# Patient Record
Sex: MALE | Race: WHITE | NOT HISPANIC OR LATINO | URBAN - METROPOLITAN AREA
[De-identification: names, ages, dates, MRNs, and addresses within clinical notes are randomized per-mention and may not be internally consistent; named-entity substitution may affect disease eponyms.]

---

## 2018-08-25 ENCOUNTER — EMERGENCY (EMERGENCY)
Facility: HOSPITAL | Age: 63
LOS: 1 days | Discharge: HOME | End: 2018-08-25
Attending: EMERGENCY MEDICINE
Payer: MEDICARE

## 2018-08-25 VITALS
SYSTOLIC BLOOD PRESSURE: 126 MMHG | RESPIRATION RATE: 16 BRPM | OXYGEN SATURATION: 98 % | DIASTOLIC BLOOD PRESSURE: 68 MMHG | HEART RATE: 60 BPM

## 2018-08-25 VITALS
OXYGEN SATURATION: 100 % | RESPIRATION RATE: 14 BRPM | DIASTOLIC BLOOD PRESSURE: 65 MMHG | TEMPERATURE: 98 F | HEART RATE: 60 BPM | WEIGHT: 106.92 LBS | SYSTOLIC BLOOD PRESSURE: 134 MMHG

## 2018-08-25 PROBLEM — Z00.00 ENCOUNTER FOR PREVENTIVE HEALTH EXAMINATION: Status: ACTIVE | Noted: 2018-08-25

## 2018-08-25 LAB
APTT BLD: 44.9 SEC — HIGH (ref 27.5–37.4)
INR BLD: 2.71 RATIO — HIGH (ref 0.88–1.16)
PROTHROM AB SERPL-ACNC: 30.2 SEC — HIGH (ref 9.8–12.7)

## 2018-08-25 PROCEDURE — 72100 X-RAY EXAM L-S SPINE 2/3 VWS: CPT

## 2018-08-25 PROCEDURE — 99284 EMERGENCY DEPT VISIT MOD MDM: CPT

## 2018-08-25 PROCEDURE — 72100 X-RAY EXAM L-S SPINE 2/3 VWS: CPT | Mod: 26

## 2018-08-25 PROCEDURE — 85610 PROTHROMBIN TIME: CPT

## 2018-08-25 PROCEDURE — 85730 THROMBOPLASTIN TIME PARTIAL: CPT

## 2018-08-25 PROCEDURE — 99284 EMERGENCY DEPT VISIT MOD MDM: CPT | Mod: 25

## 2018-08-25 PROCEDURE — 93971 EXTREMITY STUDY: CPT

## 2018-08-25 PROCEDURE — 36415 COLL VENOUS BLD VENIPUNCTURE: CPT

## 2018-08-25 PROCEDURE — 93971 EXTREMITY STUDY: CPT | Mod: 26,RT

## 2018-08-25 RX ORDER — OXYCODONE AND ACETAMINOPHEN 5; 325 MG/1; MG/1
1 TABLET ORAL ONCE
Qty: 0 | Refills: 0 | Status: DISCONTINUED | OUTPATIENT
Start: 2018-08-25 | End: 2018-08-25

## 2018-08-25 RX ORDER — CYCLOBENZAPRINE HYDROCHLORIDE 10 MG/1
1 TABLET, FILM COATED ORAL
Qty: 9 | Refills: 0
Start: 2018-08-25 | End: 2018-08-27

## 2018-08-25 RX ORDER — DIAZEPAM 5 MG
10 TABLET ORAL ONCE
Qty: 0 | Refills: 0 | Status: DISCONTINUED | OUTPATIENT
Start: 2018-08-25 | End: 2018-08-25

## 2018-08-25 RX ORDER — LIDOCAINE 4 G/100G
1 CREAM TOPICAL ONCE
Qty: 0 | Refills: 0 | Status: COMPLETED | OUTPATIENT
Start: 2018-08-25 | End: 2018-08-25

## 2018-08-25 RX ADMIN — Medication 10 MILLIGRAM(S): at 13:39

## 2018-08-25 RX ADMIN — OXYCODONE AND ACETAMINOPHEN 1 TABLET(S): 5; 325 TABLET ORAL at 17:33

## 2018-08-25 RX ADMIN — LIDOCAINE 1 PATCH: 4 CREAM TOPICAL at 15:28

## 2018-08-25 RX ADMIN — OXYCODONE AND ACETAMINOPHEN 1 TABLET(S): 5; 325 TABLET ORAL at 16:33

## 2018-08-25 NOTE — ED ADULT NURSE NOTE - CHPI ED NUR SYMPTOMS NEG
no motor function loss/no neck tenderness/no numbness/no difficulty bearing weight/no anorexia/no tingling/no fatigue

## 2018-08-25 NOTE — ED PROVIDER NOTE - CHPI ED SYMPTOMS NEG
no bowel dysfunction/no difficulty bearing weight/no motor function loss/no bladder dysfunction/no neck tenderness

## 2018-08-25 NOTE — ED PROVIDER NOTE - OBJECTIVE STATEMENT
63 y male presents with lower back pain radiating right leg, states last week thursday he was riding stationary bike 6-7 miles,  started feeling the lower back pain after that,  denies fall, no recent travel, no change in bowel and bladder habits.  no fever.  no abdominal pain.  states 1 year ago, had similar pain.  patient is on coumadin, states has hx of clot in the right leg.  PMD Dr MAGNOLIA Shell, no ortho, former smoker 20 years ago  PMH: CAD (coronary artery disease)    DM (diabetes mellitus)    HTN (hypertension)

## 2018-08-25 NOTE — ED PROVIDER NOTE - ATTENDING CONTRIBUTION TO CARE
63 y male presents with lower back pain radiating right leg,  vss, nontoxic, neuro intact, ambulatory,   no red flags, pt to f/u w/ pcp for further eval and mgmt

## 2018-08-25 NOTE — ED PROVIDER NOTE - PROGRESS NOTE DETAILS
doppler neg, xray no fx, rx for flexeril and percocet sent to pharmacy, given information for Dr Day ortho, recommend call  monday for appt time.  recommended otc lidoderm patch as directed, any concerns return to ed

## 2020-08-12 PROBLEM — I25.10 ATHEROSCLEROTIC HEART DISEASE OF NATIVE CORONARY ARTERY WITHOUT ANGINA PECTORIS: Chronic | Status: ACTIVE | Noted: 2018-08-25

## 2020-08-12 PROBLEM — I10 ESSENTIAL (PRIMARY) HYPERTENSION: Chronic | Status: ACTIVE | Noted: 2018-08-25

## 2020-08-25 ENCOUNTER — APPOINTMENT (OUTPATIENT)
Dept: SURGERY | Facility: CLINIC | Age: 65
End: 2020-08-25
Payer: MEDICARE

## 2020-08-25 VITALS
SYSTOLIC BLOOD PRESSURE: 130 MMHG | BODY MASS INDEX: 30.66 KG/M2 | DIASTOLIC BLOOD PRESSURE: 73 MMHG | WEIGHT: 207 LBS | HEIGHT: 69 IN

## 2020-08-25 DIAGNOSIS — Z86.39 PERSONAL HISTORY OF OTHER ENDOCRINE, NUTRITIONAL AND METABOLIC DISEASE: ICD-10-CM

## 2020-08-25 DIAGNOSIS — Z85.46 PERSONAL HISTORY OF MALIGNANT NEOPLASM OF PROSTATE: ICD-10-CM

## 2020-08-25 DIAGNOSIS — Z87.891 PERSONAL HISTORY OF NICOTINE DEPENDENCE: ICD-10-CM

## 2020-08-25 DIAGNOSIS — Z87.39 PERSONAL HISTORY OF OTHER DISEASES OF THE MUSCULOSKELETAL SYSTEM AND CONNECTIVE TISSUE: ICD-10-CM

## 2020-08-25 DIAGNOSIS — Z86.718 PERSONAL HISTORY OF OTHER VENOUS THROMBOSIS AND EMBOLISM: ICD-10-CM

## 2020-08-25 DIAGNOSIS — Z83.49 FAMILY HISTORY OF OTHER ENDOCRINE, NUTRITIONAL AND METABOLIC DISEASES: ICD-10-CM

## 2020-08-25 DIAGNOSIS — Z86.79 PERSONAL HISTORY OF OTHER DISEASES OF THE CIRCULATORY SYSTEM: ICD-10-CM

## 2020-08-25 PROCEDURE — 99204 OFFICE O/P NEW MOD 45 MIN: CPT

## 2020-08-25 RX ORDER — CARVEDILOL 6.25 MG/1
6.25 TABLET, FILM COATED ORAL
Refills: 0 | Status: ACTIVE | COMMUNITY

## 2020-08-25 RX ORDER — TAMSULOSIN HYDROCHLORIDE 0.4 MG/1
0.4 CAPSULE ORAL
Refills: 0 | Status: ACTIVE | COMMUNITY

## 2020-08-25 RX ORDER — GLIMEPIRIDE 4 MG/1
4 TABLET ORAL
Refills: 0 | Status: ACTIVE | COMMUNITY

## 2020-08-25 RX ORDER — WARFARIN SODIUM 6 MG/1
TABLET ORAL
Refills: 0 | Status: ACTIVE | COMMUNITY

## 2020-08-25 RX ORDER — IRBESARTAN 300 MG/1
300 TABLET ORAL
Refills: 0 | Status: ACTIVE | COMMUNITY

## 2020-08-25 RX ORDER — SITAGLIPTIN 100 MG/1
100 TABLET, FILM COATED ORAL
Refills: 0 | Status: ACTIVE | COMMUNITY

## 2020-08-25 RX ORDER — METFORMIN HYDROCHLORIDE 500 MG/1
500 TABLET, COATED ORAL
Refills: 0 | Status: ACTIVE | COMMUNITY

## 2020-08-25 RX ORDER — ROSUVASTATIN CALCIUM 20 MG/1
20 TABLET, FILM COATED ORAL
Refills: 0 | Status: ACTIVE | COMMUNITY

## 2020-08-25 RX ORDER — ALLOPURINOL 100 MG/1
100 TABLET ORAL
Refills: 0 | Status: ACTIVE | COMMUNITY

## 2020-08-27 NOTE — ASSESSMENT
[FreeTextEntry1] : Patient with papillary thyroid cancer in a multinodular goiter. I recommended a total thyroidectomy with central neck dissection. Patient will need to stop his Coumadin 2 days prior to surgery. I have reviewed the pathophysiology of the disease process, the area anatomy and the rationale for surgery.  I have discussed the risks, benefits and alternative treatments which include but are not limited to bleeding, infection, numbness, hoarseness, hypocalcemia, scarring, and need for reoperation. I have answered the patient's questions to their satisfaction. The patient wishes to proceed with recommended procedure.They will contact my office to schedule surgery.

## 2020-08-27 NOTE — REASON FOR VISIT
[Initial Consultation] : an initial consultation for [Other: _____] : [unfilled] [FreeTextEntry2] : papillary thyroid cancer with MNG

## 2020-08-27 NOTE — HISTORY OF PRESENT ILLNESS
[de-identified] : Patient referred by Dr. Thomas for evaluation of newly diagnosed papillary thyroid cancer in a multinodular goiter. Patient's endocrinologist noted a neck mass. Thyroid ultrasound, March 2020:  Right lobe 4 x 2 x 1.5 CM with 4 mm upper nodule.  The left lobe 4.4 x 1.5 x 2 CM with upper 12 x 16 x 10 mm and lower 9 x 9 x 8 mm nodules. Heterogeneous isthmus nodule with coarse calcification 12 x 11 x 8 mm. Biops  left upper nodule: AUS, Isthmus nodule: Positive for papillary thyroid cancer. Patient denies prior history of thyroid disease, dysphagia, or change in voice. Patient was treated with radiation for prostate cancer 5 years ago.

## 2020-08-27 NOTE — PHYSICAL EXAM
[Normal] : no neck adenopathy [de-identified] : no cervical or supraclavicular adenopathy, trachea midline, thyroid without enlargement or palpable mass [de-identified] : Skin:  normal appearance.  no rash, nodules, vesicles, or erythema,\par Musculoskeletal:  full range of motion and no deformities appreciated\par Neurological:  grossly intact\par Psychiatric:  oriented to person, place and time with appropriate affect

## 2020-09-10 ENCOUNTER — RESULT REVIEW (OUTPATIENT)
Age: 65
End: 2020-09-10

## 2020-09-10 ENCOUNTER — OUTPATIENT (OUTPATIENT)
Dept: OUTPATIENT SERVICES | Facility: HOSPITAL | Age: 65
LOS: 1 days | End: 2020-09-10
Payer: MEDICARE

## 2020-09-10 DIAGNOSIS — C73 MALIGNANT NEOPLASM OF THYROID GLAND: ICD-10-CM

## 2020-09-10 PROCEDURE — 88321 CONSLTJ&REPRT SLD PREP ELSWR: CPT

## 2020-09-17 DIAGNOSIS — Z98.890 OTHER SPECIFIED POSTPROCEDURAL STATES: Chronic | ICD-10-CM

## 2020-09-17 DIAGNOSIS — Z95.828 PRESENCE OF OTHER VASCULAR IMPLANTS AND GRAFTS: Chronic | ICD-10-CM

## 2020-09-17 DIAGNOSIS — Z98.49 CATARACT EXTRACTION STATUS, UNSPECIFIED EYE: Chronic | ICD-10-CM

## 2020-09-17 DIAGNOSIS — Z95.1 PRESENCE OF AORTOCORONARY BYPASS GRAFT: Chronic | ICD-10-CM

## 2020-09-18 PROBLEM — Z87.39 PERSONAL HISTORY OF OTHER DISEASES OF THE MUSCULOSKELETAL SYSTEM AND CONNECTIVE TISSUE: Chronic | Status: ACTIVE | Noted: 2020-09-17

## 2020-09-18 PROBLEM — I82.409 ACUTE EMBOLISM AND THROMBOSIS OF UNSPECIFIED DEEP VEINS OF UNSPECIFIED LOWER EXTREMITY: Chronic | Status: ACTIVE | Noted: 2020-09-17

## 2020-09-18 PROBLEM — H81.10 BENIGN PAROXYSMAL VERTIGO, UNSPECIFIED EAR: Chronic | Status: ACTIVE | Noted: 2020-09-17

## 2020-09-18 PROBLEM — C61 MALIGNANT NEOPLASM OF PROSTATE: Chronic | Status: ACTIVE | Noted: 2020-09-17

## 2020-09-18 PROBLEM — Z78.9 OTHER SPECIFIED HEALTH STATUS: Chronic | Status: ACTIVE | Noted: 2020-09-17

## 2020-09-18 PROBLEM — Z86.79 PERSONAL HISTORY OF OTHER DISEASES OF THE CIRCULATORY SYSTEM: Chronic | Status: ACTIVE | Noted: 2020-09-17

## 2020-09-18 PROBLEM — C73 MALIGNANT NEOPLASM OF THYROID GLAND: Chronic | Status: ACTIVE | Noted: 2020-09-17

## 2020-09-18 PROBLEM — E11.9 TYPE 2 DIABETES MELLITUS WITHOUT COMPLICATIONS: Chronic | Status: ACTIVE | Noted: 2020-09-17

## 2020-09-18 PROBLEM — E78.1 PURE HYPERGLYCERIDEMIA: Chronic | Status: ACTIVE | Noted: 2020-09-17

## 2020-09-21 ENCOUNTER — OUTPATIENT (OUTPATIENT)
Dept: OUTPATIENT SERVICES | Facility: HOSPITAL | Age: 65
LOS: 1 days | End: 2020-09-21
Payer: MEDICARE

## 2020-09-21 VITALS
TEMPERATURE: 97 F | SYSTOLIC BLOOD PRESSURE: 155 MMHG | DIASTOLIC BLOOD PRESSURE: 78 MMHG | HEART RATE: 55 BPM | WEIGHT: 207.23 LBS | OXYGEN SATURATION: 99 % | HEIGHT: 68 IN | RESPIRATION RATE: 15 BRPM

## 2020-09-21 DIAGNOSIS — Z98.890 OTHER SPECIFIED POSTPROCEDURAL STATES: Chronic | ICD-10-CM

## 2020-09-21 DIAGNOSIS — E07.9 DISORDER OF THYROID, UNSPECIFIED: ICD-10-CM

## 2020-09-21 DIAGNOSIS — Z98.49 CATARACT EXTRACTION STATUS, UNSPECIFIED EYE: Chronic | ICD-10-CM

## 2020-09-21 DIAGNOSIS — C73 MALIGNANT NEOPLASM OF THYROID GLAND: ICD-10-CM

## 2020-09-21 DIAGNOSIS — Z95.828 PRESENCE OF OTHER VASCULAR IMPLANTS AND GRAFTS: Chronic | ICD-10-CM

## 2020-09-21 DIAGNOSIS — Z95.1 PRESENCE OF AORTOCORONARY BYPASS GRAFT: Chronic | ICD-10-CM

## 2020-09-21 DIAGNOSIS — Z01.818 ENCOUNTER FOR OTHER PREPROCEDURAL EXAMINATION: ICD-10-CM

## 2020-09-21 LAB
A1C WITH ESTIMATED AVERAGE GLUCOSE RESULT: 7.1 % — HIGH (ref 4–5.6)
ANION GAP SERPL CALC-SCNC: 9 MMOL/L — SIGNIFICANT CHANGE UP (ref 5–17)
APTT BLD: 45.9 SEC — HIGH (ref 27.5–35.5)
BUN SERPL-MCNC: 22 MG/DL — SIGNIFICANT CHANGE UP (ref 7–23)
CALCIUM SERPL-MCNC: 10.1 MG/DL — SIGNIFICANT CHANGE UP (ref 8.4–10.5)
CHLORIDE SERPL-SCNC: 105 MMOL/L — SIGNIFICANT CHANGE UP (ref 96–108)
CO2 SERPL-SCNC: 27 MMOL/L — SIGNIFICANT CHANGE UP (ref 22–31)
CREAT SERPL-MCNC: 0.94 MG/DL — SIGNIFICANT CHANGE UP (ref 0.5–1.3)
ESTIMATED AVERAGE GLUCOSE: 157 MG/DL — HIGH (ref 68–114)
GLUCOSE SERPL-MCNC: 146 MG/DL — HIGH (ref 70–99)
INR BLD: 2.69 RATIO — HIGH (ref 0.88–1.16)
POTASSIUM SERPL-MCNC: 4 MMOL/L — SIGNIFICANT CHANGE UP (ref 3.5–5.3)
POTASSIUM SERPL-SCNC: 4 MMOL/L — SIGNIFICANT CHANGE UP (ref 3.5–5.3)
PROTHROM AB SERPL-ACNC: 31 SEC — HIGH (ref 10.6–13.6)
SODIUM SERPL-SCNC: 141 MMOL/L — SIGNIFICANT CHANGE UP (ref 135–145)

## 2020-09-21 PROCEDURE — 36415 COLL VENOUS BLD VENIPUNCTURE: CPT

## 2020-09-21 PROCEDURE — 85730 THROMBOPLASTIN TIME PARTIAL: CPT

## 2020-09-21 PROCEDURE — 85610 PROTHROMBIN TIME: CPT

## 2020-09-21 PROCEDURE — 83036 HEMOGLOBIN GLYCOSYLATED A1C: CPT

## 2020-09-21 PROCEDURE — 85027 COMPLETE CBC AUTOMATED: CPT

## 2020-09-21 PROCEDURE — G0463: CPT

## 2020-09-21 PROCEDURE — 93005 ELECTROCARDIOGRAM TRACING: CPT

## 2020-09-21 PROCEDURE — 93010 ELECTROCARDIOGRAM REPORT: CPT | Mod: NC

## 2020-09-21 PROCEDURE — 80048 BASIC METABOLIC PNL TOTAL CA: CPT

## 2020-09-21 NOTE — H&P PST ADULT - NSICDXPASTSURGICALHX_GEN_ALL_CORE_FT
PAST SURGICAL HISTORY:  [place] filter in place 2001    H/O cataract extraction bilateral 2018    History of tonsillectomy and adenoidectomy age 3    S/P CABG x 2 2001    S/P thyroid biopsy July 2020

## 2020-09-21 NOTE — H&P PST ADULT - HISTORY OF PRESENT ILLNESS
64 yo obese  M for total thyroidectomy w central neck dissection  froz section  Pt reports mass on thyroid identified April 2020  by endocrine

## 2020-09-21 NOTE — H&P PST ADULT - NSICDXPASTMEDICALHX_GEN_ALL_CORE_FT
PAST MEDICAL HISTORY:  Benign positional vertigo on rare occasion    CAD (coronary artery disease)     COVID-19 virus antibody negative August 2020    Deep vein thrombosis (DVT) 2001 after CABG, bilateral legs and 2006 developed bilateral DVT with no precipitating cause and again 2013 bilateral with no precipitating cause. Never had a hematology evaluation    H/O varicose veins     History of gout last attack 2012    HTN (hypertension)     Hypertriglyceridemia     Prostate cancer 2014, treated with RT    Thyroid cancer July 2020    Type 2 diabetes mellitus

## 2020-09-21 NOTE — H&P PST ADULT - NSICDXFAMILYHX_GEN_ALL_CORE_FT
FAMILY HISTORY:  Father  Still living? No  Family history of peptic ulcer, Age at diagnosis: Age Unknown  Family history of type 2 diabetes mellitus, Age at diagnosis: Age Unknown    Mother  Still living? No  FHx: coronary artery disease, Age at diagnosis: Age Unknown    Sibling  Still living? Yes, Estimated age: 61-70  FHx: coronary artery disease, Age at diagnosis: Age Unknown  FHx: coronary artery disease, Age at diagnosis: Age Unknown  FHx: coronary artery disease, Age at diagnosis: Age Unknown  Family history of metastatic neoplastic disease, Age at diagnosis: Age Unknown

## 2020-09-21 NOTE — H&P PST ADULT - ASSESSMENT
64 yo obese M for total thyroidectomy  w central neck dissection  froz section     Medical  clearance pending     Labs pending    Advised no diabetes meds DOS     d/c warfarin as per primary care practitioner

## 2020-09-23 LAB
HCT VFR BLD CALC: 41.3 % — SIGNIFICANT CHANGE UP (ref 39–50)
HGB BLD-MCNC: 13.3 G/DL — SIGNIFICANT CHANGE UP (ref 13–17)
MCHC RBC-ENTMCNC: 29.6 PG — SIGNIFICANT CHANGE UP (ref 27–34)
MCHC RBC-ENTMCNC: 32.2 GM/DL — SIGNIFICANT CHANGE UP (ref 32–36)
MCV RBC AUTO: 92 FL — SIGNIFICANT CHANGE UP (ref 80–100)
NRBC # BLD: 0 /100 WBCS — SIGNIFICANT CHANGE UP (ref 0–0)
PLATELET # BLD AUTO: 215 K/UL — SIGNIFICANT CHANGE UP (ref 150–400)
RBC # BLD: 4.49 M/UL — SIGNIFICANT CHANGE UP (ref 4.2–5.8)
RBC # FLD: 13.5 % — SIGNIFICANT CHANGE UP (ref 10.3–14.5)
WBC # BLD: 9.16 K/UL — SIGNIFICANT CHANGE UP (ref 3.8–10.5)
WBC # FLD AUTO: 9.16 K/UL — SIGNIFICANT CHANGE UP (ref 3.8–10.5)

## 2020-09-25 ENCOUNTER — APPOINTMENT (OUTPATIENT)
Dept: DISASTER EMERGENCY | Facility: CLINIC | Age: 65
End: 2020-09-25

## 2020-09-25 DIAGNOSIS — Z01.818 ENCOUNTER FOR OTHER PREPROCEDURAL EXAMINATION: ICD-10-CM

## 2020-09-25 LAB — SURGICAL PATHOLOGY STUDY: SIGNIFICANT CHANGE UP

## 2020-09-26 LAB — SARS-COV-2 N GENE NPH QL NAA+PROBE: NOT DETECTED

## 2020-09-27 ENCOUNTER — TRANSCRIPTION ENCOUNTER (OUTPATIENT)
Age: 65
End: 2020-09-27

## 2020-09-28 ENCOUNTER — OUTPATIENT (OUTPATIENT)
Dept: OUTPATIENT SERVICES | Facility: HOSPITAL | Age: 65
LOS: 1 days | End: 2020-09-28
Payer: MEDICARE

## 2020-09-28 ENCOUNTER — RESULT REVIEW (OUTPATIENT)
Age: 65
End: 2020-09-28

## 2020-09-28 ENCOUNTER — APPOINTMENT (OUTPATIENT)
Dept: SURGERY | Facility: CLINIC | Age: 65
End: 2020-09-28

## 2020-09-28 VITALS
OXYGEN SATURATION: 96 % | WEIGHT: 207.23 LBS | DIASTOLIC BLOOD PRESSURE: 81 MMHG | RESPIRATION RATE: 16 BRPM | SYSTOLIC BLOOD PRESSURE: 138 MMHG | TEMPERATURE: 97 F | HEIGHT: 68 IN | HEART RATE: 60 BPM

## 2020-09-28 VITALS
TEMPERATURE: 98 F | SYSTOLIC BLOOD PRESSURE: 148 MMHG | DIASTOLIC BLOOD PRESSURE: 70 MMHG | OXYGEN SATURATION: 97 % | HEART RATE: 66 BPM | RESPIRATION RATE: 16 BRPM

## 2020-09-28 DIAGNOSIS — Z98.890 OTHER SPECIFIED POSTPROCEDURAL STATES: Chronic | ICD-10-CM

## 2020-09-28 DIAGNOSIS — C73 MALIGNANT NEOPLASM OF THYROID GLAND: ICD-10-CM

## 2020-09-28 DIAGNOSIS — Z95.828 PRESENCE OF OTHER VASCULAR IMPLANTS AND GRAFTS: Chronic | ICD-10-CM

## 2020-09-28 DIAGNOSIS — Z98.49 CATARACT EXTRACTION STATUS, UNSPECIFIED EYE: Chronic | ICD-10-CM

## 2020-09-28 DIAGNOSIS — Z95.1 PRESENCE OF AORTOCORONARY BYPASS GRAFT: Chronic | ICD-10-CM

## 2020-09-28 LAB
APTT BLD: 21 SEC — LOW (ref 27.5–35.5)
GLUCOSE BLDC GLUCOMTR-MCNC: 147 MG/DL — HIGH (ref 70–99)
INR BLD: 1.09 RATIO — SIGNIFICANT CHANGE UP (ref 0.88–1.16)
PROTHROM AB SERPL-ACNC: 13.1 SEC — SIGNIFICANT CHANGE UP (ref 10.6–13.6)

## 2020-09-28 PROCEDURE — 60252 REMOVAL OF THYROID: CPT

## 2020-09-28 PROCEDURE — 88307 TISSUE EXAM BY PATHOLOGIST: CPT | Mod: 26

## 2020-09-28 PROCEDURE — 88307 TISSUE EXAM BY PATHOLOGIST: CPT

## 2020-09-28 PROCEDURE — 82962 GLUCOSE BLOOD TEST: CPT

## 2020-09-28 PROCEDURE — 36415 COLL VENOUS BLD VENIPUNCTURE: CPT

## 2020-09-28 PROCEDURE — 60252 REMOVAL OF THYROID: CPT | Mod: AS

## 2020-09-28 PROCEDURE — 85730 THROMBOPLASTIN TIME PARTIAL: CPT

## 2020-09-28 PROCEDURE — 85610 PROTHROMBIN TIME: CPT

## 2020-09-28 RX ORDER — SODIUM CHLORIDE 9 MG/ML
1000 INJECTION, SOLUTION INTRAVENOUS
Refills: 0 | Status: DISCONTINUED | OUTPATIENT
Start: 2020-09-28 | End: 2020-09-28

## 2020-09-28 RX ORDER — GLIMEPIRIDE 1 MG
1 TABLET ORAL
Qty: 0 | Refills: 0 | DISCHARGE

## 2020-09-28 RX ORDER — BENZOCAINE 10 %
1 GEL (GRAM) MUCOUS MEMBRANE
Qty: 0 | Refills: 0 | DISCHARGE

## 2020-09-28 RX ORDER — METFORMIN HYDROCHLORIDE 850 MG/1
2 TABLET ORAL
Qty: 0 | Refills: 0 | DISCHARGE

## 2020-09-28 RX ORDER — OXYCODONE HYDROCHLORIDE 5 MG/1
5 TABLET ORAL ONCE
Refills: 0 | Status: DISCONTINUED | OUTPATIENT
Start: 2020-09-28 | End: 2020-09-28

## 2020-09-28 RX ORDER — IRBESARTAN 75 MG/1
1 TABLET ORAL
Qty: 0 | Refills: 0 | DISCHARGE

## 2020-09-28 RX ORDER — CHLORTHALIDONE 50 MG
1 TABLET ORAL
Qty: 0 | Refills: 0 | DISCHARGE

## 2020-09-28 RX ORDER — ROSUVASTATIN CALCIUM 5 MG/1
1 TABLET ORAL
Qty: 0 | Refills: 0 | DISCHARGE

## 2020-09-28 RX ORDER — BENZOCAINE AND MENTHOL 5; 1 G/100ML; G/100ML
1 LIQUID ORAL
Refills: 0 | Status: DISCONTINUED | OUTPATIENT
Start: 2020-09-28 | End: 2020-10-12

## 2020-09-28 RX ORDER — SITAGLIPTIN 50 MG/1
1 TABLET, FILM COATED ORAL
Qty: 0 | Refills: 0 | DISCHARGE

## 2020-09-28 RX ORDER — SODIUM CHLORIDE 9 MG/ML
1000 INJECTION, SOLUTION INTRAVENOUS
Refills: 0 | Status: DISCONTINUED | OUTPATIENT
Start: 2020-09-28 | End: 2020-10-12

## 2020-09-28 RX ORDER — TAMSULOSIN HYDROCHLORIDE 0.4 MG/1
1 CAPSULE ORAL
Qty: 0 | Refills: 0 | DISCHARGE

## 2020-09-28 RX ORDER — ALLOPURINOL 300 MG
1 TABLET ORAL
Qty: 0 | Refills: 0 | DISCHARGE

## 2020-09-28 RX ORDER — ACETAMINOPHEN 500 MG
650 TABLET ORAL EVERY 6 HOURS
Refills: 0 | Status: DISCONTINUED | OUTPATIENT
Start: 2020-09-28 | End: 2020-10-12

## 2020-09-28 RX ORDER — ONDANSETRON 8 MG/1
4 TABLET, FILM COATED ORAL ONCE
Refills: 0 | Status: DISCONTINUED | OUTPATIENT
Start: 2020-09-28 | End: 2020-09-28

## 2020-09-28 RX ORDER — CARVEDILOL PHOSPHATE 80 MG/1
1 CAPSULE, EXTENDED RELEASE ORAL
Qty: 0 | Refills: 0 | DISCHARGE

## 2020-09-28 RX ORDER — WARFARIN SODIUM 2.5 MG/1
1 TABLET ORAL
Qty: 0 | Refills: 0 | DISCHARGE

## 2020-09-28 RX ORDER — HYDROMORPHONE HYDROCHLORIDE 2 MG/ML
0.5 INJECTION INTRAMUSCULAR; INTRAVENOUS; SUBCUTANEOUS
Refills: 0 | Status: DISCONTINUED | OUTPATIENT
Start: 2020-09-28 | End: 2020-09-28

## 2020-09-28 RX ADMIN — SODIUM CHLORIDE 30 MILLILITER(S): 9 INJECTION, SOLUTION INTRAVENOUS at 06:15

## 2020-09-28 RX ADMIN — Medication 2 TABLET(S): at 10:51

## 2020-09-28 NOTE — ASU DISCHARGE PLAN (ADULT/PEDIATRIC) - CALL YOUR DOCTOR IF YOU HAVE ANY OF THE FOLLOWING:
Fever greater than (need to indicate Fahrenheit or Celsius)/Swelling that gets worse/Wound/Surgical Site with redness, or foul smelling discharge or pus Swelling that gets worse/Wound/Surgical Site with redness, or foul smelling discharge or pus/Nausea and vomiting that does not stop/Fever greater than (need to indicate Fahrenheit or Celsius)

## 2020-09-28 NOTE — ASU PREOP CHECKLIST - SELECT TESTS ORDERED
POCT Blood Glucose/PT/PTT Sofie check@[id number] Covid-negative 9/25/20/POCT Blood Glucose/COVID/PT/PTT

## 2020-09-28 NOTE — ASU PATIENT PROFILE, ADULT - PMH
Benign positional vertigo  on rare occasion  CAD (coronary artery disease)    COVID-19 virus antibody negative  August 2020  Deep vein thrombosis (DVT)  2001 after CABG, bilateral legs and 2006 developed bilateral DVT with no precipitating cause and again 2013 bilateral with no precipitating cause. Never had a hematology evaluation  H/O varicose veins    History of gout  last attack 2012  HTN (hypertension)    Hypertriglyceridemia    Prostate cancer  2014, treated with RT  Thyroid cancer  July 2020  Type 2 diabetes mellitus

## 2020-09-28 NOTE — ASU DISCHARGE PLAN (ADULT/PEDIATRIC) - CARE PROVIDER_API CALL
Renae Rodriguez  SURGERY  [address] [place] [address], [address]  [place] [address]  Phone: [phone]  Fax: [phone]  Scheduled Appointment: 10/06/2020

## 2020-09-28 NOTE — ASU DISCHARGE PLAN (ADULT/PEDIATRIC) - ASU DC SPECIAL INSTRUCTIONSFT
Patient may resume Warfarin tonight (9/28/2020)  Take Calcium + Vit D as directed, 2 tabs every 8 hours.  May continue OTC Tylenol 325mg prn pain every 6 hours.  Take OTC Cepacol Lozenges for sore throat as needed.   Follow up with Dr. Rodriguez in 1 week.    Please go to nearest ED if you begin to have symptoms of Hypocalcemia including numbness/tingling of extremities.

## 2020-09-28 NOTE — ASU PATIENT PROFILE, ADULT - PSH
[place] filter in place  2001  H/O cataract extraction  bilateral 2018  History of tonsillectomy and adenoidectomy  age 3  S/P CABG x 2  2001  S/P thyroid biopsy  July 2020

## 2020-10-05 LAB — SURGICAL PATHOLOGY STUDY: SIGNIFICANT CHANGE UP

## 2020-10-06 ENCOUNTER — APPOINTMENT (OUTPATIENT)
Dept: SURGERY | Facility: CLINIC | Age: 65
End: 2020-10-06
Payer: MEDICARE

## 2020-10-06 DIAGNOSIS — E04.2 NONTOXIC MULTINODULAR GOITER: ICD-10-CM

## 2020-10-06 PROCEDURE — 99024 POSTOP FOLLOW-UP VISIT: CPT

## 2020-10-06 NOTE — HISTORY OF PRESENT ILLNESS
[de-identified] : Patient referred by Dr. Thomas for evaluation of newly diagnosed papillary thyroid cancer in a multinodular goiter. Patient's endocrinologist noted a neck mass. Thyroid ultrasound, March 2020:  Right lobe 4 x 2 x 1.5 CM with 4 mm upper nodule.  The left lobe 4.4 x 1.5 x 2 CM with upper 12 x 16 x 10 mm and lower 9 x 9 x 8 mm nodules. Heterogeneous isthmus nodule with coarse calcification 12 x 11 x 8 mm. Biops  left upper nodule: AUS, Isthmus nodule: Positive for papillary thyroid cancer. Patient denies prior history of thyroid disease, dysphagia, or change in voice. Patient was treated with radiation for prostate cancer 5 years ago.\par 9/28/20 Total thyroidectomy with CND. path, multifocal papillary 2 cm with 1/5 LN positive.  Denies dysphagia, hoarseness or parathesias.

## 2020-10-06 NOTE — ASSESSMENT
[FreeTextEntry1] : Patient with papillary thyroid cancer with positive LN.  i have receommended AGUIAR treatment.  seeing Dr Thomas in 6 weeks. RTO 4 mo

## 2020-10-06 NOTE — PHYSICAL EXAM
[de-identified] : no cervical or supraclavicular adenopathy, trachea midline,incision healing well, scar min dsicussed [Normal] : no neck adenopathy [de-identified] : Skin:  normal appearance.  no rash, nodules, vesicles, or erythema,\par Musculoskeletal:  full range of motion and no deformities appreciated\par Neurological:  grossly intact\par Psychiatric:  oriented to person, place and time with appropriate affect

## 2020-10-09 NOTE — ED PROVIDER NOTE - NSTOBACCO TYPE_GEN_A_CORE_RD
Cigarettes Quality 226: Preventive Care And Screening: Tobacco Use: Screening And Cessation Intervention: Patient screened for tobacco use and is an ex/non-smoker Detail Level: Generalized Quality 431: Preventive Care And Screening: Unhealthy Alcohol Use - Screening: Patient screened for unhealthy alcohol use using a single question and scores less than 2 times per year Quality 130: Documentation Of Current Medications In The Medical Record: Current Medications Documented

## 2021-02-09 ENCOUNTER — APPOINTMENT (OUTPATIENT)
Dept: SURGERY | Facility: CLINIC | Age: 66
End: 2021-02-09
Payer: MEDICARE

## 2021-02-09 ENCOUNTER — LABORATORY RESULT (OUTPATIENT)
Age: 66
End: 2021-02-09

## 2021-02-09 PROCEDURE — 36415 COLL VENOUS BLD VENIPUNCTURE: CPT

## 2021-02-09 PROCEDURE — 99072 ADDL SUPL MATRL&STAF TM PHE: CPT

## 2021-02-09 PROCEDURE — 99213 OFFICE O/P EST LOW 20 MIN: CPT

## 2021-02-09 NOTE — ASSESSMENT
[FreeTextEntry1] : Patient with papillary thyroid cancer with positive LN.  i have receommended AGUIAR treatment. nisreen sent.  RTO 6 mo.  no evidence of recurrence on PE.  I reviewed the expected course of illness and the intent of current treatment with the patient. I have answered her questions.\par

## 2021-02-09 NOTE — HISTORY OF PRESENT ILLNESS
[de-identified] : Patient referred by Dr. Thomas for evaluation of newly diagnosed papillary thyroid cancer in a multinodular goiter. Patient's endocrinologist noted a neck mass. Thyroid ultrasound, March 2020:  Right lobe 4 x 2 x 1.5 CM with 4 mm upper nodule.  The left lobe 4.4 x 1.5 x 2 CM with upper 12 x 16 x 10 mm and lower 9 x 9 x 8 mm nodules. Heterogeneous isthmus nodule with coarse calcification 12 x 11 x 8 mm. Biops  left upper nodule: AUS, Isthmus nodule: Positive for papillary thyroid cancer. Patient denies prior history of thyroid disease, dysphagia, or change in voice. Patient was treated with radiation for prostate cancer 5 years ago.\par 9/28/20 Total thyroidectomy with CND. path, multifocal papillary 2 cm with 1/5 LN positive.  Denies dysphagia, hoarseness or parathesias. dose change 12/2020 after TSH 1.6,  TG 0.2,  feeling well. grand daughter undergoing surgery for brain tumor, patient has not had AGUIAR treatment.

## 2021-02-09 NOTE — PHYSICAL EXAM
[de-identified] : no cervical or supraclavicular adenopathy, trachea midline,incision healing well, no palpable masses  scar min discussed [Normal] : no neck adenopathy [de-identified] : Skin:  normal appearance.  no rash, nodules, vesicles, or erythema,\par Musculoskeletal:  full range of motion and no deformities appreciated\par Neurological:  grossly intact\par Psychiatric:  oriented to person, place and time with appropriate affect

## 2021-02-24 ENCOUNTER — NON-APPOINTMENT (OUTPATIENT)
Age: 66
End: 2021-02-24

## 2021-02-24 LAB
T3 SERPL-MCNC: 114 NG/DL
T4 FREE SERPL-MCNC: 1.5 NG/DL
THYROGLOB AB SERPL-ACNC: <20 IU/ML
THYROGLOB SERPL-MCNC: <0.2 NG/ML
TSH SERPL-ACNC: 0.18 UIU/ML

## 2021-08-05 ENCOUNTER — LABORATORY RESULT (OUTPATIENT)
Age: 66
End: 2021-08-05

## 2021-08-05 ENCOUNTER — APPOINTMENT (OUTPATIENT)
Dept: SURGERY | Facility: CLINIC | Age: 66
End: 2021-08-05
Payer: MEDICARE

## 2021-08-05 PROCEDURE — 36415 COLL VENOUS BLD VENIPUNCTURE: CPT

## 2021-08-05 PROCEDURE — 99213 OFFICE O/P EST LOW 20 MIN: CPT

## 2021-08-05 NOTE — ASSESSMENT
[FreeTextEntry1] : Patient with papillary thyroid cancer with positive LN.  i have recommended AGUIAR treatment. nisreen sent. neck US requested now   RTO 6 mo.  no evidence of recurrence on PE.  I reviewed the expected course of illness and the intent of current treatment with the patient. I have answered her questions.\par

## 2021-08-05 NOTE — PHYSICAL EXAM
[de-identified] : no cervical or supraclavicular adenopathy, trachea midline,incision healing well, no palpable masses  scar min discussed [Normal] : no neck adenopathy [de-identified] : Skin:  normal appearance.  no rash, nodules, vesicles, or erythema,\par Musculoskeletal:  full range of motion and no deformities appreciated\par Neurological:  grossly intact\par Psychiatric:  oriented to person, place and time with appropriate affect

## 2021-08-05 NOTE — HISTORY OF PRESENT ILLNESS
[de-identified] : Patient referred by Dr. Thomas for evaluation of newly diagnosed papillary thyroid cancer in a multinodular goiter. Patient's endocrinologist noted a neck mass. Thyroid ultrasound, March 2020:  Right lobe 4 x 2 x 1.5 CM with 4 mm upper nodule.  The left lobe 4.4 x 1.5 x 2 CM with upper 12 x 16 x 10 mm and lower 9 x 9 x 8 mm nodules. Heterogeneous isthmus nodule with coarse calcification 12 x 11 x 8 mm. Biops  left upper nodule: AUS, Isthmus nodule: Positive for papillary thyroid cancer. Patient denies prior history of thyroid disease, dysphagia, or change in voice. Patient was treated with radiation for prostate cancer 5 years ago.\par 9/28/20 Total thyroidectomy with CND. path, multifocal papillary 2 cm with 1/5 LN positive.  Denies dysphagia, hoarseness or parathesias. dose change 12/2020 after TSH 1.6,  TG 0.2,  feeling well. been on 200 since 2/2021 no recent nisreen., patient has not had AGUIAR treatment. denies palpitations or recent illness.  I have reviewed all old and new data and available images.

## 2021-08-19 ENCOUNTER — NON-APPOINTMENT (OUTPATIENT)
Age: 66
End: 2021-08-19

## 2021-08-19 LAB
T3 SERPL-MCNC: 138 NG/DL
T4 FREE SERPL-MCNC: 2 NG/DL
THYROGLOB AB SERPL-ACNC: <20 IU/ML
THYROGLOB SERPL-MCNC: 0.26 NG/ML
TSH SERPL-ACNC: 0.01 UIU/ML

## 2021-10-15 ENCOUNTER — EMERGENCY (EMERGENCY)
Facility: HOSPITAL | Age: 66
LOS: 1 days | Discharge: HOME | End: 2021-10-15
Attending: EMERGENCY MEDICINE | Admitting: EMERGENCY MEDICINE
Payer: SELF-PAY

## 2021-10-15 VITALS
DIASTOLIC BLOOD PRESSURE: 74 MMHG | TEMPERATURE: 98 F | HEART RATE: 72 BPM | SYSTOLIC BLOOD PRESSURE: 134 MMHG | RESPIRATION RATE: 16 BRPM | OXYGEN SATURATION: 99 %

## 2021-10-15 VITALS
TEMPERATURE: 99 F | SYSTOLIC BLOOD PRESSURE: 108 MMHG | RESPIRATION RATE: 18 BRPM | WEIGHT: 207.01 LBS | HEART RATE: 74 BPM | DIASTOLIC BLOOD PRESSURE: 71 MMHG | HEIGHT: 69 IN | OXYGEN SATURATION: 99 %

## 2021-10-15 DIAGNOSIS — Z95.828 PRESENCE OF OTHER VASCULAR IMPLANTS AND GRAFTS: Chronic | ICD-10-CM

## 2021-10-15 DIAGNOSIS — Z98.890 OTHER SPECIFIED POSTPROCEDURAL STATES: Chronic | ICD-10-CM

## 2021-10-15 DIAGNOSIS — Z98.49 CATARACT EXTRACTION STATUS, UNSPECIFIED EYE: Chronic | ICD-10-CM

## 2021-10-15 DIAGNOSIS — Z95.1 PRESENCE OF AORTOCORONARY BYPASS GRAFT: Chronic | ICD-10-CM

## 2021-10-15 LAB
INR BLD: 2.03 RATIO — HIGH (ref 0.88–1.16)
PROTHROM AB SERPL-ACNC: 23.7 SEC — HIGH (ref 10.6–13.6)

## 2021-10-15 PROCEDURE — 85610 PROTHROMBIN TIME: CPT

## 2021-10-15 PROCEDURE — 99284 EMERGENCY DEPT VISIT MOD MDM: CPT

## 2021-10-15 PROCEDURE — 99283 EMERGENCY DEPT VISIT LOW MDM: CPT

## 2021-10-15 PROCEDURE — 36415 COLL VENOUS BLD VENIPUNCTURE: CPT

## 2021-10-15 NOTE — ED PROVIDER NOTE - PATIENT PORTAL LINK FT
You can access the FollowMyHealth Patient Portal offered by [facility] by registering at the following website: [email or web address]. By joining [facility]’s FollowMyHealth portal, you will also be able to view your health information using other applications (apps) compatible with our system.

## 2021-10-15 NOTE — ED ADULT NURSE NOTE - NSIMPLEMENTINTERV_GEN_ALL_ED
Implemented All Universal Safety Interventions:  Farnam to call system. Call bell, personal items and telephone within reach. Instruct patient to call for assistance. Room bathroom lighting operational. Non-slip footwear when patient is off stretcher. Physically safe environment: no spills, clutter or unnecessary equipment. Stretcher in lowest position, wheels locked, appropriate side rails in place.

## 2021-10-15 NOTE — ED PROVIDER NOTE - OBJECTIVE STATEMENT
65 y/o M w/ PMHx of varicose veins presents to ED c/o bleeding varicosities to RLE x 45 minutes ago. Pt w/ recent RLE injury on Tuesday. Pt states that he hit corner of sharp cabinet. Pt states that the next morning he noticed a scratch and noticed a sock w/ dry blood. Pt bent over today while at work and noticed puddle of blood beneath him. Pt reports this had happened before many years ago. Pt has hx of  bypass x 20 years ago and is on coumadin.

## 2021-10-15 NOTE — ED ADULT TRIAGE NOTE - CHIEF COMPLAINT QUOTE
bleeding varicosities to RLE x parminder 15 mins. hx varicose veins w/ recent injury to right lower leg on Tuesday. No bleeding at that time.

## 2021-10-15 NOTE — ED PROVIDER NOTE - NS_ ATTENDINGSCRIBEDETAILS _ED_A_ED_FT
RLE bleeding varicose vein on coumadin.  check INR  apply pressure v stitch    agree with scribe documentation

## 2021-10-15 NOTE — ED ADULT NURSE NOTE - OBJECTIVE STATEMENT
Possible bleeding from a varicose vein, sudden bleeding on lateral right lower leg. Bleeding controlled prior to arrival by EMS placing Jodie to area. Patient reports had hit area on Wednesday and felt a scab I te area, states he thinks it might have come off. +PP. No active bleeding, no pain. VSSt Possible bleeding from a varicose vein, sudden bleeding on lateral right lower leg. Bleeding controlled prior to arrival by EMS placing Jodie to area. Patient reports had hit area on Wednesday and felt a scab I te area, states he thinks it might have come off. +PP. No active bleeding, no pain. VSS

## 2021-10-15 NOTE — ED PROVIDER NOTE - CLINICAL SUMMARY MEDICAL DECISION MAKING FREE TEXT BOX
Bleeding varicose vein. Will hold pressure hold and check I&R. Bleeding varicose vein. Will hold pressure hold and check INR.

## 2021-11-17 RX ORDER — ALLOPURINOL 300 MG
0 TABLET ORAL
Qty: 0 | Refills: 0 | DISCHARGE

## 2021-11-17 RX ORDER — CHLORTHALIDONE 50 MG
1 TABLET ORAL
Qty: 0 | Refills: 0 | DISCHARGE

## 2021-11-17 RX ORDER — EMPAGLIFLOZIN, METFORMIN HYDROCHLORIDE 10; 1000 MG/1; MG/1
1 TABLET, EXTENDED RELEASE ORAL
Qty: 0 | Refills: 0 | DISCHARGE

## 2021-11-17 RX ORDER — TAMSULOSIN HYDROCHLORIDE 0.4 MG/1
1 CAPSULE ORAL
Qty: 0 | Refills: 0 | DISCHARGE

## 2021-11-17 RX ORDER — SITAGLIPTIN 50 MG/1
1 TABLET, FILM COATED ORAL
Qty: 0 | Refills: 0 | DISCHARGE

## 2021-11-17 RX ORDER — ROSUVASTATIN CALCIUM 5 MG/1
1 TABLET ORAL
Qty: 0 | Refills: 0 | DISCHARGE

## 2021-11-17 RX ORDER — WARFARIN SODIUM 2.5 MG/1
1 TABLET ORAL
Qty: 0 | Refills: 0 | DISCHARGE

## 2021-11-17 RX ORDER — IRBESARTAN 75 MG/1
1 TABLET ORAL
Qty: 0 | Refills: 0 | DISCHARGE

## 2021-11-17 RX ORDER — LEVOTHYROXINE SODIUM 125 MCG
1 TABLET ORAL
Qty: 0 | Refills: 0 | DISCHARGE

## 2021-11-17 RX ORDER — CARVEDILOL PHOSPHATE 80 MG/1
1 CAPSULE, EXTENDED RELEASE ORAL
Qty: 0 | Refills: 0 | DISCHARGE

## 2021-11-17 RX ORDER — GLIMEPIRIDE 1 MG
1 TABLET ORAL
Qty: 0 | Refills: 0 | DISCHARGE

## 2022-01-13 NOTE — ASU PREOP CHECKLIST - ALLERGY BAND ON
no known allergies I personally performed the service described in the documentation recorded by the scribe in my presence, and it accurately and completely records my words and actions.

## 2022-03-31 ENCOUNTER — LABORATORY RESULT (OUTPATIENT)
Age: 67
End: 2022-03-31

## 2022-03-31 ENCOUNTER — APPOINTMENT (OUTPATIENT)
Dept: SURGERY | Facility: CLINIC | Age: 67
End: 2022-03-31
Payer: MEDICARE

## 2022-03-31 PROBLEM — C61 MALIGNANT NEOPLASM OF PROSTATE: Chronic | Status: ACTIVE | Noted: 2021-10-15

## 2022-03-31 PROBLEM — I83.90 ASYMPTOMATIC VARICOSE VEINS OF UNSPECIFIED LOWER EXTREMITY: Chronic | Status: ACTIVE | Noted: 2021-10-15

## 2022-03-31 PROBLEM — E03.9 HYPOTHYROIDISM, UNSPECIFIED: Chronic | Status: ACTIVE | Noted: 2021-10-15

## 2022-03-31 PROBLEM — E11.9 TYPE 2 DIABETES MELLITUS WITHOUT COMPLICATIONS: Chronic | Status: ACTIVE | Noted: 2021-10-15

## 2022-03-31 PROCEDURE — 36415 COLL VENOUS BLD VENIPUNCTURE: CPT

## 2022-03-31 PROCEDURE — 99213 OFFICE O/P EST LOW 20 MIN: CPT

## 2022-03-31 NOTE — PHYSICAL EXAM
[de-identified] : no cervical or supraclavicular adenopathy, trachea midline,incision healing well, no palpable masses  scar min discussed [Normal] : no neck adenopathy [de-identified] : Skin:  normal appearance.  no rash, nodules, vesicles, or erythema,\par Musculoskeletal:  full range of motion and no deformities appreciated\par Neurological:  grossly intact\par Psychiatric:  oriented to person, place and time with appropriate affect

## 2022-03-31 NOTE — ASSESSMENT
[FreeTextEntry1] : Patient with papillary thyroid cancer with positive LN.  i have recommended AGUIAR treatment. nisreen sent.  RTO 6 mo.  no evidence of recurrence on PE.  I reviewed the expected course of illness and the intent of current treatment with the patient. I have answered her questions.\par

## 2022-03-31 NOTE — HISTORY OF PRESENT ILLNESS
[de-identified] : Patient referred by Dr. Thomas for evaluation of newly diagnosed papillary thyroid cancer in a multinodular goiter. Patient's endocrinologist noted a neck mass. Thyroid ultrasound, March 2020:  Right lobe 4 x 2 x 1.5 CM with 4 mm upper nodule.  The left lobe 4.4 x 1.5 x 2 CM with upper 12 x 16 x 10 mm and lower 9 x 9 x 8 mm nodules. Heterogeneous isthmus nodule with coarse calcification 12 x 11 x 8 mm. Biops  left upper nodule: AUS, Isthmus nodule: Positive for papillary thyroid cancer. Patient denies prior history of thyroid disease, dysphagia, or change in voice. Patient was treated with radiation for prostate cancer 5 years ago.\par 9/28/20 Total thyroidectomy with CND. path, multifocal papillary 2 cm with 1/5 LN positive.  Denies dysphagia, hoarseness or parathesias. dose change 12/2020 after TSH 1.6,  TG 0.2,  feeling well. been on 200 since 2/2021 no recent nisreen., patient has not had AGUIAR treatment. neck US 10/2021 no evidence of recurrence.   denies palpitations or recent illness. dose decreased to 175 1/2022 .  I have reviewed all old and new data and available images.

## 2022-04-02 ENCOUNTER — NON-APPOINTMENT (OUTPATIENT)
Age: 67
End: 2022-04-02

## 2022-04-02 LAB
T3 SERPL-MCNC: 109 NG/DL
T4 FREE SERPL-MCNC: 1.8 NG/DL
THYROGLOB AB SERPL-ACNC: <20 IU/ML
THYROGLOB SERPL-MCNC: <0.2 NG/ML
TSH SERPL-ACNC: 0.07 UIU/ML

## 2022-08-08 NOTE — ED ADULT NURSE NOTE - AS SC BRADEN SENSORY
Validate Anticipated Plan: No Was A Bandage Applied: Yes Electrodesiccation And Curettage Text: The wound bed was treated with electrodesiccation and curettage after the biopsy was performed. Post-Care Instructions: I reviewed with the patient in detail post-care instructions. Patient is to keep the biopsy site dry overnight, and then wash daily with a gentle cleanser. Afterwards, pat dry and apply Vaseline and bandage daily until healed. For optimal wound healing, apply SPF 30+ or keep covered until pigmentation fades. Silver Nitrate Text: The wound bed was treated with silver nitrate after the biopsy was performed. Additional Anesthesia Volume In Cc (Will Not Render If 0): 0 Anesthesia Type: 1% lidocaine with epinephrine Type Of Destruction Used: Curettage Information: Selecting Yes will display possible errors in your note based on the variables you have selected. This validation is only offered as a suggestion for you. PLEASE NOTE THAT THE VALIDATION TEXT WILL BE REMOVED WHEN YOU FINALIZE YOUR NOTE. IF YOU WANT TO FAX A PRELIMINARY NOTE YOU WILL NEED TO TOGGLE THIS TO 'NO' IF YOU DO NOT WANT IT IN YOUR FAXED NOTE. Biopsy Method: Personna blade Wound Care: Petrolatum Biopsy Type: H and E Dressing: bandage Electrodesiccation Text: The wound bed was treated with electrodesiccation after the biopsy was performed. (4) no impairment Notification Instructions: Patient will be notified of biopsy results. However, patient should call the office if not contacted within 2 weeks. Anesthesia Volume In Cc (Will Not Render If 0): 0.5 Consent: Written consent was obtained. Patient was offered the consent document and offered time to review and ask any questions prior to signing. Curettage Text: The wound bed was treated with curettage after the biopsy was performed. Billing Type: Third-Party Bill Cryotherapy Text: The wound bed was treated with cryotherapy after the biopsy was performed. Detail Level: Detailed Depth Of Biopsy: dermis Hemostasis: Drysol

## 2022-09-29 ENCOUNTER — APPOINTMENT (OUTPATIENT)
Dept: SURGERY | Facility: CLINIC | Age: 67
End: 2022-09-29

## 2022-09-29 PROCEDURE — 99213 OFFICE O/P EST LOW 20 MIN: CPT

## 2022-09-29 NOTE — HISTORY OF PRESENT ILLNESS
[de-identified] : Patient referred by Dr. Thomas for evaluation of newly diagnosed papillary thyroid cancer in a multinodular goiter. Patient's endocrinologist noted a neck mass. Thyroid ultrasound, March 2020:  Right lobe 4 x 2 x 1.5 CM with 4 mm upper nodule.  The left lobe 4.4 x 1.5 x 2 CM with upper 12 x 16 x 10 mm and lower 9 x 9 x 8 mm nodules. Heterogeneous isthmus nodule with coarse calcification 12 x 11 x 8 mm. Biops  left upper nodule: AUS, Isthmus nodule: Positive for papillary thyroid cancer. Patient denies prior history of thyroid disease, dysphagia, or change in voice. Patient was treated with radiation for prostate cancer 5 years ago.\par 9/28/20 Total thyroidectomy with CND. path, multifocal papillary 2 cm with 1/5 LN positive.  Denies dysphagia, hoarseness or parathesias. dose change 12/2020 after TSH 1.6,  TG 0.2,  feeling well. been on 200 since 2/2021 no recent nisreen., patient has not had AGUIAR treatment. neck US 10/2021 no evidence of recurrence.  Scheduled for follow-up ultrasound October 31, 2022.  Recent blood work reviewed.  Denies palpitations or recent illness. dose decreased to 150 several months ago.  Denies fatigue or weight gain.  I have reviewed all old and new data and available images.

## 2022-09-29 NOTE — PHYSICAL EXAM
[de-identified] : no cervical or supraclavicular adenopathy, trachea midline,incision healing well, no palpable masses  scar min discussed [Normal] : no neck adenopathy [de-identified] : Skin:  normal appearance.  no rash, nodules, vesicles, or erythema,\par Musculoskeletal:  full range of motion and no deformities appreciated\par Neurological:  grossly intact\par Psychiatric:  oriented to person, place and time with appropriate affect

## 2022-09-29 NOTE — ASSESSMENT
[FreeTextEntry1] : Patient with papillary thyroid cancer with positive LN.  i have recommended AGUIAR treatment but patient has refused.  Will obtain neck ultrasound.   RTO 6 mo.  no evidence of recurrence on PE.  I reviewed the expected course of illness and the intent of current treatment with the patient. I have answered her questions.\par

## 2023-07-13 NOTE — ED PROVIDER NOTE - DISCUSSED CLINICAL AND RADIOLOGICAL FINDINGS WITH, MDM
[address]    [phone]    Post Pain Management Injection    PATIENT INSTRUCTIONS:     -Resume Normal Diet  -Other    ACTIVITY:    -No driving or operating machinery for 8 hours post procedure without sedation and 24 hours with sedation. If you are seen driving during this time the proper authorities will be notified.  -Do not stay alone for 4-6 hours after the procedure.  -If you have had IV sedation, do not sign legal documents, make any major decisions, or be involved in work decisions for the remainder of the day. -May shower or bathe.  -Resume normal activity when full movement/sensation has returned in extremities. 3)  SITE CARE:    -Observe puncture site for signs of infection (redness, warmth swelling, drainage with a foul odor, fever or increased tenderness). 4)  EXPECTED SIDE EFFECTS:    -Numbness/tingling/weakness in extremities, if this lasts more than 6 hours notify Dr. Cherelle Howard. -Muscle stiffness, soreness at puncture site (soreness may last 2-4 days). DIABETIC PATIENTS ONLY:    -Increased glucose levels in all diabetic patients who have received a steroid injection. -Monitor blood sugars frequently for the first 5 days following procedure.      -Adjust medication accordingly. 6)  TO REACH DR. Cherelle Howard: Call [phone]    ADDITIONAL INSTRUCTIONS:    Follow-up as scheduled or call for appointment if not already done. Patients taking Coumadin may resume taking as before the procedure. patient

## 2023-09-12 ENCOUNTER — LABORATORY RESULT (OUTPATIENT)
Age: 68
End: 2023-09-12

## 2023-09-12 ENCOUNTER — APPOINTMENT (OUTPATIENT)
Dept: SURGERY | Facility: CLINIC | Age: 68
End: 2023-09-12
Payer: MEDICARE

## 2023-09-12 DIAGNOSIS — C77.9 SECONDARY AND UNSPECIFIED MALIGNANT NEOPLASM OF LYMPH NODE, UNSPECIFIED: ICD-10-CM

## 2023-09-12 DIAGNOSIS — E89.0 POSTPROCEDURAL HYPOTHYROIDISM: ICD-10-CM

## 2023-09-12 DIAGNOSIS — C73 MALIGNANT NEOPLASM OF THYROID GLAND: ICD-10-CM

## 2023-09-12 PROCEDURE — 36415 COLL VENOUS BLD VENIPUNCTURE: CPT

## 2023-09-12 PROCEDURE — 99213 OFFICE O/P EST LOW 20 MIN: CPT

## 2023-09-14 ENCOUNTER — NON-APPOINTMENT (OUTPATIENT)
Age: 68
End: 2023-09-14

## 2023-09-14 LAB
T3 SERPL-MCNC: 104 NG/DL
T4 FREE SERPL-MCNC: 1.6 NG/DL
THYROGLOB AB SERPL-ACNC: <20 IU/ML
THYROGLOB SERPL-MCNC: <0.2 NG/ML
TSH SERPL-ACNC: 1.7 UIU/ML

## 2023-09-14 RX ORDER — LEVOTHYROXINE SODIUM 0.15 MG/1
150 TABLET ORAL
Qty: 90 | Refills: 3 | Status: ACTIVE | COMMUNITY
Start: 2020-10-06 | End: 1900-01-01

## 2023-10-25 ENCOUNTER — NON-APPOINTMENT (OUTPATIENT)
Age: 68
End: 2023-10-25

## 2023-12-15 NOTE — ED ADULT NURSE NOTE - CAS DISCH CONDITION
Physical Therapy Visit    Visit Type: Daily Treatment Note  Visit: 4  Referring Provider: Margot Marrero MD  Medical Diagnosis (from order): M25.551 - Right hip pain     SUBJECTIVE                                                                                                               Patient reports that her hip is not too bad as she hasn't worked since prior to her last appointment.  Patient reports that she hasn't done much yesterday due to taking her  to doctor's appointments.  No pain currently, just a little pain when getting up from bed this morning.    Functional Change: None to report     Pain / Symptoms  - Pain rating (out of 10): Current: 0       OBJECTIVE                                                                                                                                       Treatment     Therapeutic Exercise  -Nu Step to improve blood flow, improve tissue mobility, reduce joint stiffness, and promote release of endogenous opioids for pain reduction 6 minutes level 4 seat 6   - pelvic mobility on swiss ball all directions 10 repetitions each way    - posterior Pelvic tilt in hook lying 5 minutes     - posterior Pelvic tilt with BKFO x 10 repetitions x 2 sets alternating right/left   - SKTC 3 repetitions 30 second holds right/left   - clamshells 10 repetitions 2 sets right/left         Skilled input: as detailed above, demonstration, verbal instruction/cues, tactile instruction/cues and posture correction    Writer verbally educated and received verbal consent for hand placement, positioning of patient, and techniques to be performed today from patient for therapist position for techniques and hand placement and palpation for techniques as described above and how they are pertinent to the patient's plan of care.  Home Exercise Program  Access Code: [email or web address]  URL: [email or web address]  Date: 12/08/2023  Prepared by: Lyn Coello    Exercises  - Supine  Pelvic Tilt  - 2 x daily - 7 x weekly - 3 sets - 10 reps - 5 hold  - Bent Knee Fallouts with Alternating Legs  - 2 x daily - 7 x weekly - 3 sets - 10 reps  - Supine Single Knee to Chest Stretch  - 2 x daily - 7 x weekly - 3 reps - 30 hold  12/15/23: clamshells       ASSESSMENT                                                                                                            Patient reported slight increase in pain with posterior pelvic tilts in hook lying this visit, improved when cued for slower engagement and smaller movement.  Patient did well with new clamshell exercise and was able to tolerate laying on right hip.      Patient reported hip felt more stiff at end of session but did not quantify pain  Education:   - Results of above outlined education: Verbalizes understanding, Demonstrates understanding and Needs reinforcement    PLAN                                                                                                                           Suggestions for next session as indicated: Progress per plan of care, continue hip strengthening in unloaded position       Therapy procedure time and total treatment time can be found documented on the Time Entry flowsheet     Improved

## 2024-03-05 ENCOUNTER — APPOINTMENT (OUTPATIENT)
Dept: SURGERY | Facility: CLINIC | Age: 69
End: 2024-03-05

## 2024-10-02 ENCOUNTER — RX RENEWAL (OUTPATIENT)
Age: 69
End: 2024-10-02

## 2025-04-24 NOTE — ED PROVIDER NOTE - TEMPLATE
Spoke with pharmacist at [facility] to clarify Fioricet orders per JOSE Keller NP orders.   Wounds